# Patient Record
Sex: FEMALE | Race: ASIAN | URBAN - METROPOLITAN AREA
[De-identification: names, ages, dates, MRNs, and addresses within clinical notes are randomized per-mention and may not be internally consistent; named-entity substitution may affect disease eponyms.]

---

## 2023-03-21 ENCOUNTER — OFFICE VISIT (OUTPATIENT)
Dept: PRIMARY CARE CLINIC | Age: 33
End: 2023-03-21
Payer: COMMERCIAL

## 2023-03-21 VITALS
DIASTOLIC BLOOD PRESSURE: 81 MMHG | RESPIRATION RATE: 18 BRPM | TEMPERATURE: 97.5 F | WEIGHT: 224 LBS | HEART RATE: 100 BPM | OXYGEN SATURATION: 98 % | BODY MASS INDEX: 38.24 KG/M2 | SYSTOLIC BLOOD PRESSURE: 122 MMHG | HEIGHT: 64 IN

## 2023-03-21 DIAGNOSIS — F90.9 ATTENTION DEFICIT HYPERACTIVITY DISORDER (ADHD), UNSPECIFIED ADHD TYPE: Primary | ICD-10-CM

## 2023-03-21 DIAGNOSIS — B00.1 RECURRENT COLD SORES: ICD-10-CM

## 2023-03-21 DIAGNOSIS — E78.5 HYPERLIPIDEMIA, UNSPECIFIED HYPERLIPIDEMIA TYPE: ICD-10-CM

## 2023-03-21 DIAGNOSIS — Z79.899 LONG-TERM CURRENT USE OF STIMULANT: ICD-10-CM

## 2023-03-21 DIAGNOSIS — F41.9 ANXIETY AND DEPRESSION: ICD-10-CM

## 2023-03-21 DIAGNOSIS — F32.A ANXIETY AND DEPRESSION: ICD-10-CM

## 2023-03-21 DIAGNOSIS — J30.89 ENVIRONMENTAL AND SEASONAL ALLERGIES: ICD-10-CM

## 2023-03-21 PROCEDURE — 93000 ELECTROCARDIOGRAM COMPLETE: CPT | Performed by: FAMILY MEDICINE

## 2023-03-21 PROCEDURE — 99204 OFFICE O/P NEW MOD 45 MIN: CPT | Performed by: FAMILY MEDICINE

## 2023-03-21 RX ORDER — BUPROPION HYDROCHLORIDE 150 MG/1
TABLET ORAL
COMMUNITY
Start: 2023-02-09

## 2023-03-21 RX ORDER — KRILL/OM-3/DHA/EPA/PHOSPHO/AST 1000-170MG
CAPSULE ORAL
COMMUNITY

## 2023-03-21 RX ORDER — DEXTROAMPHETAMINE SACCHARATE, AMPHETAMINE ASPARTATE MONOHYDRATE, DEXTROAMPHETAMINE SULFATE AND AMPHETAMINE SULFATE 6.25; 6.25; 6.25; 6.25 MG/1; MG/1; MG/1; MG/1
CAPSULE, EXTENDED RELEASE ORAL
COMMUNITY
Start: 2023-03-01

## 2023-03-21 RX ORDER — VALACYCLOVIR HYDROCHLORIDE 1 G/1
TABLET, FILM COATED ORAL
COMMUNITY

## 2023-03-21 RX ORDER — ADHESIVE TAPE 1.5"X360"
TAPE, NON-MEDICATED TOPICAL
COMMUNITY

## 2023-03-21 RX ORDER — MINERAL OIL
ENEMA (ML) RECTAL
COMMUNITY

## 2023-03-21 RX ORDER — METHOCARBAMOL 750 MG/1
TABLET, FILM COATED ORAL
COMMUNITY

## 2023-03-21 RX ORDER — CALCIUM CARB/VITAMIN D3/VIT K1 500-500-40
TABLET,CHEWABLE ORAL
COMMUNITY

## 2023-03-21 RX ORDER — MUPIROCIN 20 MG/G
OINTMENT TOPICAL
COMMUNITY

## 2023-03-21 RX ORDER — SERTRALINE HYDROCHLORIDE 100 MG/1
TABLET, FILM COATED ORAL
COMMUNITY
Start: 2020-08-01

## 2023-03-21 RX ORDER — MULTIVIT-MINERALS/FOLIC ACID 200 MCG
TABLET,CHEWABLE ORAL
COMMUNITY

## 2023-03-21 NOTE — PROGRESS NOTES
HPI     Chief Complaint   Patient presents with    Establish Care     She is a 26 y.o. female who presents for establishing care. Anxiety and Depression - Currently on Zoloft, Wellbutrin. Followed by Dr. Randy Can (private practice). ADHD - Current on Adderall through her psychiatrist as well. States psych is requesting an EKG because they were thinking of switching her to a different stimulant medication. States BP is typically normal. States she gets nervous coming into the doctor. Cold sores - Currently on Valtrex. Takes PRN. TMJ - Has had this for a while. Uses a  every night. States she takes Robaxin as needed when she gets bad flares. Seasonal Allergies - Takes an antihistamine. Has issues with nasal dryness. Has been seeing Allergy for a while and tried allergy shots. Did nasal sprays for a while. Took her off this entirely because they think it is due to nasal dryness. Has been started on Mupirocin. Doing saline spray and gels daily. States she takes Allegra for pruritis. Has sensitive skin. Saw Derm for this about 6 months ago. Has not had any routine labs in a few years. States she has been told she has HLD many times before. Had a PAP in 2019 in [place]. Normal at that time. Review of Systems   Respiratory:  Negative for shortness of breath. Cardiovascular:  Negative for chest pain and palpitations. Allergic/Immunologic: Positive for environmental allergies. Reviewed PmHx, RxHx, FmHx, SocHx, AllgHx and updated and dated in the chart. Physical Exam:  Visit Vitals  /81   Pulse 100   Temp 97.5 °F (36.4 °C) (Temporal)   Resp 18   Ht 5' 4\" (1.626 m)   Wt 224 lb (101.6 kg)   LMP 03/20/2023   SpO2 98%   BMI 38.45 kg/m²     Physical Exam  Vitals and nursing note reviewed. Constitutional:       General: She is not in acute distress. Appearance: Normal appearance. She is not ill-appearing.    Cardiovascular:      Rate and Rhythm: Normal rate and regular rhythm. Heart sounds: No murmur heard. Pulmonary:      Effort: Pulmonary effort is normal. No respiratory distress. Breath sounds: Normal breath sounds. No wheezing, rhonchi or rales. Neurological:      General: No focal deficit present. Mental Status: She is alert. Psychiatric:         Mood and Affect: Mood normal.         Behavior: Behavior normal.       POC EKG - NSR, 91  No results found for this or any previous visit (from the past 12 hour(s)). Assessment / Plan     Diagnoses and all orders for this visit:    1. Attention deficit hyperactivity disorder (ADHD), unspecified ADHD type  -     METABOLIC PANEL, COMPREHENSIVE; Future  -     CBC WITH AUTOMATED DIFF; Future    2. Anxiety and depression  -     METABOLIC PANEL, COMPREHENSIVE; Future  -     CBC WITH AUTOMATED DIFF; Future  -     TSH 3RD GENERATION; Future    3. Hyperlipidemia, unspecified hyperlipidemia type  -     LIPID PANEL; Future    4. Long-term current use of stimulant  -     AMB POC EKG ROUTINE W/ 12 LEADS, INTER & REP    5. Recurrent cold sores    6. Environmental and seasonal allergies     Check routine labs. She will call us back with number to fax EKG to for her psychiatrist.     I have discussed the diagnosis with the patient and the intended plan as seen in the above orders. The patient has received an after-visit summary and questions were answered concerning future plans. I have discussed medication side effects and warnings with the patient as well.     Ekta Griffin,

## 2023-03-21 NOTE — PROGRESS NOTES
Chief Complaint   Patient presents with    Establish Care      Identified pt with two pt identifiers(name and ). Chief Complaint   Patient presents with    Establish Care        No flowsheet data found. There were no vitals filed for this visit. Health Maintenance Due   Topic Date Due    Hepatitis C Screening  Never done    Depression Screen  Never done    COVID-19 Vaccine (1) Never done    DTaP/Tdap/Td series (1 - Tdap) Never done    Cervical cancer screen  Never done    Flu Vaccine (1) Never done        1. Have you been to the ER, urgent care clinic since your last visit? Hospitalized since your last visit? No    2. Have you seen or consulted any other health care providers outside of the [address] [place] [address] since your last visit? Include any pap smears or colon screening. Yes ENT - Dr. Pati Rene, Dermatologist - [facility]    3. For patients aged 39-70: Has the patient had a colonoscopy / FIT/ Cologuard? NA - based on age      If the patient is female:    4. For patients aged 41-77: Has the patient had a mammogram within the past 2 years? NA - based on age or sex      11. For patients aged 21-65: Has the patient had a pap smear?  No

## 2023-12-27 ENCOUNTER — NURSE TRIAGE (OUTPATIENT)
Dept: OTHER | Facility: CLINIC | Age: 33
End: 2023-12-27

## 2023-12-27 NOTE — TELEPHONE ENCOUNTER
[facility]    Received call from [facility] at [employer] with "[employer]. Subjective: Caller states having symptoms of UTI. Tested at urgent care yesterday and was told she didn't have a UTI and recommended getting OTC Azo. Current Symptoms: Burning, blood in urine, pain and urgency worsened after being seen at [facility] and started taking Azo. Also had chills last night but doesn't have a thermometer. Also some low back pain. Onset: 2 days ago; sudden, worsening    Associated Symptoms: NA    Pain Severity: 5/10; burning, pressure; intermittent    Temperature: No thermometer     What has been tried: Azo, increased fluids, prophylactic Valtrex to prevent cold sore    LMP:  Last week  Pregnant: No    Recommended disposition: See in Office Today    Care advice provided, patient verbalizes understanding; denies any other questions or concerns; instructed to call back for any new or worsening symptoms. Patient/Caller agrees with recommended disposition; writer provided warm transfer to [facility] [employer] for appointment scheduling    Attention Provider: Thank you for allowing me to participate in the care of your patient. The patient was connected to triage in response to information provided to the ECC/PSC. Please do not respond through this encounter as the response is not directed to a shared pool.       Reason for Disposition   All other females with painful urination, or patient wants to be seen    Protocols used: Urination Pain - Female-ADULT-[place]